# Patient Record
Sex: MALE | Race: WHITE | HISPANIC OR LATINO | URBAN - METROPOLITAN AREA
[De-identification: names, ages, dates, MRNs, and addresses within clinical notes are randomized per-mention and may not be internally consistent; named-entity substitution may affect disease eponyms.]

---

## 2021-04-05 PROBLEM — Z00.00 ENCOUNTER FOR PREVENTIVE HEALTH EXAMINATION: Status: ACTIVE | Noted: 2021-04-05

## 2021-04-26 ENCOUNTER — OUTPATIENT (OUTPATIENT)
Dept: OUTPATIENT SERVICES | Facility: HOSPITAL | Age: 31
LOS: 1 days | End: 2021-04-26
Payer: SELF-PAY

## 2021-04-26 ENCOUNTER — APPOINTMENT (OUTPATIENT)
Dept: UROLOGY | Facility: HOSPITAL | Age: 31
End: 2021-04-26

## 2021-04-26 VITALS
TEMPERATURE: 97.8 F | RESPIRATION RATE: 14 BRPM | WEIGHT: 198 LBS | BODY MASS INDEX: 32.99 KG/M2 | DIASTOLIC BLOOD PRESSURE: 86 MMHG | HEIGHT: 65 IN | HEART RATE: 61 BPM | SYSTOLIC BLOOD PRESSURE: 135 MMHG

## 2021-04-26 DIAGNOSIS — F52.4 PREMATURE EJACULATION: ICD-10-CM

## 2021-04-26 DIAGNOSIS — R30.0 DYSURIA: ICD-10-CM

## 2021-04-26 PROCEDURE — G0463: CPT

## 2021-04-26 NOTE — PHYSICAL EXAM
[General Appearance - Well Developed] : well developed [General Appearance - Well Nourished] : well nourished [Normal Appearance] : normal appearance [Well Groomed] : well groomed [General Appearance - In No Acute Distress] : no acute distress [Heart Rate And Rhythm] : Heart rate and rhythm were normal [Edema] : no peripheral edema [Respiration, Rhythm And Depth] : normal respiratory rhythm and effort [Exaggerated Use Of Accessory Muscles For Inspiration] : no accessory muscle use [Auscultation Breath Sounds / Voice Sounds] : lungs were clear to auscultation bilaterally [Abdomen Soft] : soft [Abdomen Tenderness] : non-tender [Costovertebral Angle Tenderness] : no ~M costovertebral angle tenderness [Urethral Meatus] : meatus normal [Penis Abnormality] : normal uncircumcised penis [Scrotum] : the scrotum was normal [Epididymis] : the epididymides were normal [Testes Tenderness] : no tenderness of the testes [Testes Mass (___cm)] : there were no testicular masses [] : no rash [No Focal Deficits] : no focal deficits [Oriented To Time, Place, And Person] : oriented to person, place, and time [Affect] : the affect was normal [Mood] : the mood was normal [Not Anxious] : not anxious

## 2021-04-26 NOTE — HISTORY OF PRESENT ILLNESS
[None] : no symptoms [FreeTextEntry1] : Patient is a 30 yoM with no pertinent pmhx, presenting with 3 years of decreased ejaculatory latency time. At baseline the patient said he would last 45 minutes to an hour prior to ejaculation, but now he lasts 3 minutes. He has had multiple partners over the past three year and he had similarly short time across these partners. He also reports short ejaculatory times when masturbating. He endorses mild work-related stress, but denies any emotional problems with his current sexual partner or feelings of depression/anxiety. He is not taking any medications, and does not consume any alcohol, tobacco, or recreational drugs. RoS is negative for pain during sex, penile discharge, polyuria, dysuria, or hematuria.\par \par PMHx: None\par PSHx: None\par Medications: None\par Allergies: NKDA\par FHx: Unknown\par SHx: No tobacco/drugs/alcohol. Sexually active with one female partner, does not use contraception.\par  [Erectile Dysfunction] : no Erectile Dysfunction [Dysuria] : no dysuria [Hematuria - Gross] : no gross hematuria [Hematuria - Microscopic] : no microscopic hematuria [Bladder Spasm] : no bladder spasm [Abdominal Pain] : no abdominal pain [Flank Pain] : no flank pain [Edema] : ~T edema was not present

## 2021-04-26 NOTE — ASSESSMENT
[FreeTextEntry1] : Assessment: Patient is 30yoM with no pmhx presenting with 3 years of decreased time to ejaculation relative to his baseline, occuring both during sex with partner and masturbation. RoS and physical exam are normal. Patient's current time to ejaculation of 3 minutes is within normal limits, but is causing patient subjective distress.\par \par Plan:\par 1. Patient given education regarding normal ranges and variation in time to ejaculation\par 2. Patient given non-pharmacological methods to increase time to ejaculation \par 3. Viagra 50mg before sex (take Viagia on empty stomach) to increase time to ejaculation\par 4. F/u in 2 months

## 2021-05-05 RX ORDER — SILDENAFIL 50 MG/1
50 TABLET ORAL
Qty: 10 | Refills: 0 | Status: ACTIVE | COMMUNITY
Start: 2021-04-26 | End: 1900-01-01

## 2021-06-21 ENCOUNTER — APPOINTMENT (OUTPATIENT)
Dept: UROLOGY | Facility: HOSPITAL | Age: 31
End: 2021-06-21